# Patient Record
Sex: MALE | Race: WHITE | URBAN - METROPOLITAN AREA
[De-identification: names, ages, dates, MRNs, and addresses within clinical notes are randomized per-mention and may not be internally consistent; named-entity substitution may affect disease eponyms.]

---

## 2017-01-11 PROBLEM — R42 DIZZINESS AND GIDDINESS: Status: ACTIVE | Noted: 2017-01-11

## 2017-05-16 ENCOUNTER — NURSE ONLY (OUTPATIENT)
Dept: NEUROLOGY | Facility: CLINIC | Age: 47
End: 2017-05-16

## 2017-05-16 ENCOUNTER — OFFICE VISIT (OUTPATIENT)
Dept: NEUROLOGY | Facility: CLINIC | Age: 47
End: 2017-05-16

## 2017-05-16 VITALS
DIASTOLIC BLOOD PRESSURE: 90 MMHG | WEIGHT: 239 LBS | HEART RATE: 68 BPM | RESPIRATION RATE: 16 BRPM | SYSTOLIC BLOOD PRESSURE: 138 MMHG | BODY MASS INDEX: 35 KG/M2

## 2017-05-16 DIAGNOSIS — R51.9 HEADACHE, UNSPECIFIED HEADACHE TYPE: ICD-10-CM

## 2017-05-16 DIAGNOSIS — G43.809 MIGRAINE VARIANT: Primary | ICD-10-CM

## 2017-05-16 DIAGNOSIS — R42 DIZZINESS AND GIDDINESS: Primary | ICD-10-CM

## 2017-05-16 DIAGNOSIS — R42 MIGRAINOUS DIZZINESS: ICD-10-CM

## 2017-05-16 PROCEDURE — 99244 OFF/OP CNSLTJ NEW/EST MOD 40: CPT | Performed by: OTHER

## 2017-05-16 PROCEDURE — 95816 EEG AWAKE AND DROWSY: CPT | Performed by: OTHER

## 2017-05-16 RX ORDER — DIVALPROEX SODIUM 250 MG/1
250 TABLET, EXTENDED RELEASE ORAL NIGHTLY
Qty: 30 TABLET | Refills: 2 | Status: SHIPPED | OUTPATIENT
Start: 2017-05-16 | End: 2017-07-10

## 2017-05-16 RX ORDER — DIVALPROEX SODIUM 250 MG/1
250 TABLET, EXTENDED RELEASE ORAL NIGHTLY
Qty: 30 TABLET | Refills: 2 | Status: SHIPPED | OUTPATIENT
Start: 2017-05-16 | End: 2017-05-16

## 2017-05-16 NOTE — PATIENT INSTRUCTIONS
Refill policies:    • Allow 2 business days for refills; controlled substances may take longer.   • Contact your pharmacy at least 5 days prior to running out of medication and have them send an electronic request or submit request through the “request re insurance carrier to obtain pre-certification or prior authorization. Unfortunately, [employer] has seen an increase in denial of payment even though the procedure/test has been pre-certified.   You are strongly encouraged to contact your insurance carrier to v

## 2017-05-16 NOTE — PROGRESS NOTES
Pt here for evaluation of dizziness/vertigo, headaches, and tinnitus. ENT and ruled out vestibular cause. Symptoms started about 10 months ago, but have gotten worse over last few months.

## 2017-05-16 NOTE — PROGRESS NOTES
HPI:    Patient ID: Lainey Diaz is a 50year old male. PCP: Dr Damian Rodgers     HPI    Mr Vu Negron is a 50year old male who presented for evaluation of dizziness and headaches. He states he has been having dizziness for about 10 months.  Initially it was mild b Review of Systems   Constitutional: Negative. HENT: Negative for hearing loss and tinnitus. Eyes: Positive for visual disturbance. Respiratory: Negative. Gastrointestinal: Negative. Endocrine: Negative. Genitourinary: Negative.     Musc disc sharps. Extraocular muscle intact. Slow saccades. Visual fields intact. V: Normal facial sensation   VII: Face is symmetric with normal strength. VIII: Normal hearing bilaterally. IX, X: Symmetric palate elevation. Uvula in midline.    XI: Normal This Visit:  Signed Prescriptions Disp Refills    divalproex Sodium  MG Oral Tablet 24 Hr 30 tablet 2      Sig: Take 1 tablet (250 mg total) by mouth nightly.            Imaging & Referrals:  None     JUAN ANTONIO#[id number]

## 2017-05-19 ENCOUNTER — TELEPHONE (OUTPATIENT)
Dept: NEUROLOGY | Facility: CLINIC | Age: 47
End: 2017-05-19

## 2017-05-19 NOTE — PROCEDURES
ELECTROENCEPHALOGRAM REPORT      Patient Name: Laura Toribio  Chart ID: [id number]  Ordering Physician: Dr Jerrica Rodríguez Date of Test: 5/16/2017  Referring Physician: Dr Dejuan Acosta  Patient Diagnosis:  Migraine variant.  Rule out seizures    TECHNICAL SUMMARY  1. 10

## 2017-05-22 ENCOUNTER — TELEPHONE (OUTPATIENT)
Dept: NEUROLOGY | Facility: CLINIC | Age: 47
End: 2017-05-22

## 2017-07-10 PROBLEM — K76.0 FATTY LIVER: Status: ACTIVE | Noted: 2017-07-10

## 2017-09-12 PROBLEM — C73 PAPILLARY THYROID CARCINOMA (HCC): Status: ACTIVE | Noted: 2017-09-12

## 2018-05-23 PROBLEM — Z12.11 COLON CANCER SCREENING: Status: ACTIVE | Noted: 2018-05-23

## 2018-11-01 PROCEDURE — 81003 URINALYSIS AUTO W/O SCOPE: CPT | Performed by: FAMILY MEDICINE

## 2020-07-06 PROBLEM — N40.1 BPH WITH OBSTRUCTION/LOWER URINARY TRACT SYMPTOMS: Status: ACTIVE | Noted: 2020-07-06

## 2020-07-06 PROBLEM — N13.8 BPH WITH OBSTRUCTION/LOWER URINARY TRACT SYMPTOMS: Status: ACTIVE | Noted: 2020-07-06

## 2020-07-06 PROBLEM — D35.02 ADENOMA OF LEFT ADRENAL GLAND: Status: ACTIVE | Noted: 2020-07-06

## 2020-07-06 PROBLEM — N52.9 MALE ERECTILE DISORDER: Status: ACTIVE | Noted: 2020-07-06

## (undated) NOTE — MR AVS SNAPSHOT
[address], [address] [address] [phone]               Thank you for choosing us for your health care visit with PERICO Arriaga.   We are glad to serve you and happy to provide you with [facility] You can access your MyChart to more actively manage your health care and view more details from this visit by going to [email or web address].   If you've recently had a stay at the Hospital you can access your discharge instructions in [address] by antonia

## (undated) NOTE — MR AVS SNAPSHOT
[address], [place] [address] [phone]               Thank you for choosing us for your health care visit with Imelda Ott MD.  We are glad to serve you and happy to provide you with this physically brought to the pharmacy. A 30 day supply with no refills is the maximum allowed. ? If your prescription is due for a refill, you may be due for a follow up appointment.   ? To best provide you care, patients receiving routine medications need t No Known Allergies                Today's Vital Signs     BP Pulse Weight             138/90 mmHg 68 239 lb            Current Medications          This list is accurate as of: 5/16/17 11:59 PM.  Always use your most recent med list.                divalp